# Patient Record
Sex: MALE | Race: OTHER | Employment: OTHER | URBAN - METROPOLITAN AREA
[De-identification: names, ages, dates, MRNs, and addresses within clinical notes are randomized per-mention and may not be internally consistent; named-entity substitution may affect disease eponyms.]

---

## 2018-01-09 LAB
ALBUMIN SERPL-MCNC: 4.4 G/DL (ref 3.9–4.9)
ALP BLD-CCNC: 101 U/L (ref 35–104)
ALT SERPL-CCNC: 32 U/L (ref 0–41)
ANION GAP SERPL CALCULATED.3IONS-SCNC: 15 MEQ/L (ref 7–13)
AST SERPL-CCNC: 16 U/L (ref 0–40)
BILIRUB SERPL-MCNC: 0.5 MG/DL (ref 0–1.2)
BILIRUBIN DIRECT: 0 MG/DL (ref 0–0.3)
BILIRUBIN, INDIRECT: 0.5 MG/DL (ref 0–0.6)
BUN BLDV-MCNC: 14 MG/DL (ref 6–20)
CALCIUM SERPL-MCNC: 9.4 MG/DL (ref 8.6–10.2)
CHLORIDE BLD-SCNC: 105 MEQ/L (ref 98–107)
CHOLESTEROL, TOTAL: 167 MG/DL (ref 0–199)
CO2: 25 MEQ/L (ref 22–29)
CREAT SERPL-MCNC: 0.84 MG/DL (ref 0.7–1.2)
GFR AFRICAN AMERICAN: >60
GFR NON-AFRICAN AMERICAN: >60
GLUCOSE BLD-MCNC: 134 MG/DL (ref 74–109)
HCT VFR BLD CALC: 47.4 % (ref 42–52)
HDLC SERPL-MCNC: 29 MG/DL (ref 40–59)
HEMOGLOBIN: 16.1 G/DL (ref 14–18)
LDL CHOLESTEROL CALCULATED: 77 MG/DL (ref 0–129)
MCH RBC QN AUTO: 30.8 PG (ref 27–31.3)
MCHC RBC AUTO-ENTMCNC: 33.9 % (ref 33–37)
MCV RBC AUTO: 90.7 FL (ref 80–100)
PDW BLD-RTO: 13.1 % (ref 11.5–14.5)
PLATELET # BLD: 212 K/UL (ref 130–400)
POTASSIUM SERPL-SCNC: 4 MEQ/L (ref 3.5–5.1)
RBC # BLD: 5.22 M/UL (ref 4.7–6.1)
SODIUM BLD-SCNC: 145 MEQ/L (ref 132–144)
TOTAL PROTEIN: 7 G/DL (ref 6.4–8.1)
TRIGL SERPL-MCNC: 306 MG/DL (ref 0–200)
WBC # BLD: 8.6 K/UL (ref 4.8–10.8)

## 2018-01-17 ENCOUNTER — HOSPITAL ENCOUNTER (OUTPATIENT)
Dept: NUCLEAR MEDICINE | Age: 53
Discharge: HOME | End: 2018-01-17
Payer: COMMERCIAL

## 2018-01-17 NOTE — PROCEDURES
Pt scheduled and no showed multiple times. Both phone # called to confirm, pt does not answer. Spoke to [place] at [facility] about multiple No Shows. Advised to schedule elsewhere. Nini at [facility] agrees. Non Compliant patient.

## 2019-07-19 LAB
ALBUMIN: 4.5 G/DL (ref 3.4–5)
ALP BLD-CCNC: 112 U/L (ref 33–120)
ALT SERPL-CCNC: 60 U/L (ref 10–52)
ANION GAP SERPL CALCULATED.3IONS-SCNC: 15 MMOL/L (ref 10–20)
AST SERPL-CCNC: 24 U/L (ref 9–39)
BICARBONATE: 25 MMOL/L (ref 21–32)
BILIRUB SERPL-MCNC: 0.5 MG/DL (ref 0–1.2)
BILIRUBIN DIRECT: 0 MG/DL (ref 0–0.3)
CALCIUM SERPL-MCNC: 9.7 MG/DL (ref 8.6–10.3)
CHLORIDE BLD-SCNC: 106 MMOL/L (ref 98–107)
CHOLESTEROL/HDL RATIO: 10.9
CHOLESTEROL: 304 MG/DL (ref 0–199)
CREAT SERPL-MCNC: 1.03 MG/DL (ref 0.5–1.3)
GFR AFRICAN AMERICAN: >60 ML/MIN/1.73M2
GFR NON-AFRICAN AMERICAN: >60 ML/MIN/1.73M2
GLUCOSE: 182 MG/DL (ref 74–99)
HDLC SERPL-MCNC: 28 MG/DL
LDL CHOLESTEROL DIRECT: 52 MG/DL (ref 0–129)
LDL CHOLESTEROL: ABNORMAL MG/DL (ref 0–99)
POTASSIUM SERPL-SCNC: 4.1 MMOL/L (ref 3.5–5.3)
SODIUM BLD-SCNC: 142 MMOL/L (ref 136–145)
TOTAL PROTEIN: 7.5 G/DL (ref 6.4–8.2)
TRIGL SERPL-MCNC: 1642 MG/DL (ref 0–149)
UREA NITROGEN: 14 MG/DL (ref 6–23)
VLDLC SERPL CALC-MCNC: ABNORMAL MG/DL (ref 0–40)

## 2019-07-29 ENCOUNTER — HOSPITAL ENCOUNTER (OUTPATIENT)
Dept: NON INVASIVE DIAGNOSTICS | Age: 54
Discharge: HOME | End: 2019-07-29
Payer: COMMERCIAL

## 2019-07-29 LAB
LV EF: 55 %
LVEF MODALITY: NORMAL

## 2019-07-29 PROCEDURE — 93306 TTE W/DOPPLER COMPLETE: CPT

## 2019-11-23 ENCOUNTER — HOSPITAL ENCOUNTER (EMERGENCY)
Age: 54
Discharge: HOME | End: 2019-11-23
Attending: EMERGENCY MEDICINE
Payer: COMMERCIAL

## 2019-11-23 VITALS
DIASTOLIC BLOOD PRESSURE: 95 MMHG | HEIGHT: 64 IN | SYSTOLIC BLOOD PRESSURE: 164 MMHG | BODY MASS INDEX: 28.17 KG/M2 | TEMPERATURE: 98 F | RESPIRATION RATE: 18 BRPM | WEIGHT: 165 LBS | HEART RATE: 110 BPM | OXYGEN SATURATION: 98 %

## 2019-11-23 DIAGNOSIS — N34.2 URETHRITIS: ICD-10-CM

## 2019-11-23 DIAGNOSIS — R30.0 DYSURIA: Primary | ICD-10-CM

## 2019-11-23 LAB
BACTERIA: ABNORMAL /HPF
BILIRUBIN URINE: NEGATIVE
BLOOD, URINE: ABNORMAL
CLARITY: CLEAR
COLOR: YELLOW
EPITHELIAL CELLS, UA: ABNORMAL /HPF (ref 0–5)
GLUCOSE URINE: >=1000 MG/DL
HYALINE CASTS: ABNORMAL /HPF (ref 0–5)
KETONES, URINE: NEGATIVE MG/DL
LEUKOCYTE ESTERASE, URINE: ABNORMAL
NITRITE, URINE: NEGATIVE
PH UA: 5 (ref 5–9)
PROTEIN UA: ABNORMAL MG/DL
RBC UA: ABNORMAL /HPF (ref 0–5)
SPECIFIC GRAVITY UA: 1.02 (ref 1–1.03)
URINE REFLEX TO CULTURE: YES
UROBILINOGEN, URINE: 0.2 E.U./DL
WBC UA: >100 /HPF (ref 0–5)

## 2019-11-23 PROCEDURE — 96372 THER/PROPH/DIAG INJ SC/IM: CPT

## 2019-11-23 PROCEDURE — 87186 SC STD MICRODIL/AGAR DIL: CPT

## 2019-11-23 PROCEDURE — 6360000002 HC RX W HCPCS: Performed by: EMERGENCY MEDICINE

## 2019-11-23 PROCEDURE — 99283 EMERGENCY DEPT VISIT LOW MDM: CPT

## 2019-11-23 PROCEDURE — 87661 TRICHOMONAS VAGINALIS AMPLIF: CPT

## 2019-11-23 PROCEDURE — 87491 CHLMYD TRACH DNA AMP PROBE: CPT

## 2019-11-23 PROCEDURE — 87591 N.GONORRHOEAE DNA AMP PROB: CPT

## 2019-11-23 PROCEDURE — 87077 CULTURE AEROBIC IDENTIFY: CPT

## 2019-11-23 PROCEDURE — 87086 URINE CULTURE/COLONY COUNT: CPT

## 2019-11-23 PROCEDURE — 81001 URINALYSIS AUTO W/SCOPE: CPT

## 2019-11-23 RX ORDER — CEFTRIAXONE SODIUM 250 MG/1
250 INJECTION, POWDER, FOR SOLUTION INTRAMUSCULAR; INTRAVENOUS ONCE
Status: COMPLETED | OUTPATIENT
Start: 2019-11-23 | End: 2019-11-23

## 2019-11-23 RX ORDER — DOXYCYCLINE 100 MG/1
100 TABLET ORAL 2 TIMES DAILY
Qty: 28 TABLET | Refills: 0 | Status: SHIPPED | OUTPATIENT
Start: 2019-11-23 | End: 2019-12-07

## 2019-11-23 RX ADMIN — CEFTRIAXONE SODIUM 250 MG: 250 INJECTION, POWDER, FOR SOLUTION INTRAMUSCULAR; INTRAVENOUS at 21:47

## 2019-11-23 ASSESSMENT — ENCOUNTER SYMPTOMS
SHORTNESS OF BREATH: 0
SORE THROAT: 0
ABDOMINAL PAIN: 0
EYE PAIN: 0
NAUSEA: 0
DIARRHEA: 0
BACK PAIN: 1
EYE DISCHARGE: 0
RESPIRATORY NEGATIVE: 1
SINUS PAIN: 0
COUGH: 0
CHEST TIGHTNESS: 0
EYES NEGATIVE: 1
GASTROINTESTINAL NEGATIVE: 1
ABDOMINAL DISTENTION: 0
WHEEZING: 0
BLOOD IN STOOL: 0
VOMITING: 0

## 2019-11-23 ASSESSMENT — PAIN DESCRIPTION - LOCATION: LOCATION: ABDOMEN

## 2019-11-23 ASSESSMENT — PAIN DESCRIPTION - ORIENTATION: ORIENTATION: MID

## 2019-11-23 ASSESSMENT — PAIN SCALES - GENERAL: PAINLEVEL_OUTOF10: 8

## 2019-11-26 LAB
ORGANISM: ABNORMAL
ORGANISM: ABNORMAL
SPECIMEN SOURCE: NORMAL
T. VAGINALIS AMPLIFIED: NEGATIVE
URINE CULTURE, ROUTINE: ABNORMAL
URINE CULTURE, ROUTINE: ABNORMAL

## 2019-11-27 LAB
C. TRACHOMATIS DNA ,URINE: NEGATIVE
N. GONORRHOEAE DNA, URINE: NEGATIVE